# Patient Record
Sex: FEMALE | Race: WHITE | NOT HISPANIC OR LATINO | URBAN - METROPOLITAN AREA
[De-identification: names, ages, dates, MRNs, and addresses within clinical notes are randomized per-mention and may not be internally consistent; named-entity substitution may affect disease eponyms.]

---

## 2017-06-09 ENCOUNTER — OUTPATIENT (OUTPATIENT)
Dept: OUTPATIENT SERVICES | Facility: HOSPITAL | Age: 77
LOS: 1 days | End: 2017-06-09

## 2018-01-10 PROBLEM — Z00.00 ENCOUNTER FOR PREVENTIVE HEALTH EXAMINATION: Status: ACTIVE | Noted: 2018-01-10

## 2018-01-24 ENCOUNTER — APPOINTMENT (OUTPATIENT)
Dept: CARDIOLOGY | Facility: CLINIC | Age: 78
End: 2018-01-24
Payer: MEDICARE

## 2018-01-24 VITALS
DIASTOLIC BLOOD PRESSURE: 74 MMHG | HEART RATE: 60 BPM | HEIGHT: 61 IN | BODY MASS INDEX: 33.23 KG/M2 | WEIGHT: 176 LBS | SYSTOLIC BLOOD PRESSURE: 130 MMHG

## 2018-01-24 DIAGNOSIS — Z82.49 FAMILY HISTORY OF ISCHEMIC HEART DISEASE AND OTHER DISEASES OF THE CIRCULATORY SYSTEM: ICD-10-CM

## 2018-01-24 PROCEDURE — 99214 OFFICE O/P EST MOD 30 MIN: CPT

## 2018-01-24 RX ORDER — ROSUVASTATIN CALCIUM 20 MG/1
20 TABLET, FILM COATED ORAL
Refills: 0 | Status: ACTIVE | COMMUNITY

## 2018-01-24 RX ORDER — OMEGA-3/DHA/EPA/FISH OIL 35-113.5MG
1000 TABLET,CHEWABLE ORAL
Refills: 0 | Status: ACTIVE | COMMUNITY

## 2018-01-24 RX ORDER — LEVOTHYROXINE SODIUM 0.07 MG/1
75 TABLET ORAL DAILY
Refills: 0 | Status: ACTIVE | COMMUNITY

## 2018-01-24 RX ORDER — UBIDECARENONE/VIT E ACET 100MG-5
100 CAPSULE ORAL
Refills: 0 | Status: ACTIVE | COMMUNITY

## 2018-01-24 RX ORDER — CALCIUM CITRATE/VITAMIN D3 315MG-6.25
TABLET ORAL
Refills: 0 | Status: ACTIVE | COMMUNITY

## 2018-01-24 RX ORDER — CHROMIUM 200 MCG
400 TABLET ORAL
Refills: 0 | Status: ACTIVE | COMMUNITY

## 2018-01-24 RX ORDER — MULTIVIT-MIN/FOLIC/VIT K/LYCOP 400-300MCG
1000 TABLET ORAL
Refills: 0 | Status: ACTIVE | COMMUNITY

## 2018-01-24 RX ORDER — BIOTIN 5 MG
5 CAPSULE ORAL
Refills: 0 | Status: ACTIVE | COMMUNITY

## 2018-04-19 ENCOUNTER — APPOINTMENT (OUTPATIENT)
Dept: CARDIOLOGY | Facility: CLINIC | Age: 78
End: 2018-04-19
Payer: MEDICARE

## 2018-04-19 VITALS
HEIGHT: 61 IN | DIASTOLIC BLOOD PRESSURE: 84 MMHG | HEART RATE: 78 BPM | SYSTOLIC BLOOD PRESSURE: 132 MMHG | WEIGHT: 78 LBS | BODY MASS INDEX: 14.73 KG/M2

## 2018-04-19 PROCEDURE — 99214 OFFICE O/P EST MOD 30 MIN: CPT

## 2018-05-24 ENCOUNTER — APPOINTMENT (OUTPATIENT)
Dept: CARDIOLOGY | Facility: CLINIC | Age: 78
End: 2018-05-24
Payer: MEDICARE

## 2018-05-24 PROCEDURE — 93015 CV STRESS TEST SUPVJ I&R: CPT

## 2018-05-24 PROCEDURE — 78452 HT MUSCLE IMAGE SPECT MULT: CPT

## 2018-05-24 PROCEDURE — A9502: CPT

## 2018-05-24 PROCEDURE — 93306 TTE W/DOPPLER COMPLETE: CPT

## 2018-05-29 ENCOUNTER — APPOINTMENT (OUTPATIENT)
Dept: CARDIOLOGY | Facility: CLINIC | Age: 78
End: 2018-05-29
Payer: MEDICARE

## 2018-05-29 VITALS
WEIGHT: 183 LBS | HEIGHT: 61 IN | SYSTOLIC BLOOD PRESSURE: 124 MMHG | BODY MASS INDEX: 34.55 KG/M2 | HEART RATE: 64 BPM | DIASTOLIC BLOOD PRESSURE: 78 MMHG

## 2018-05-29 DIAGNOSIS — Z86.39 PERSONAL HISTORY OF OTHER ENDOCRINE, NUTRITIONAL AND METABOLIC DISEASE: ICD-10-CM

## 2018-05-29 DIAGNOSIS — Z86.79 PERSONAL HISTORY OF OTHER DISEASES OF THE CIRCULATORY SYSTEM: ICD-10-CM

## 2018-05-29 DIAGNOSIS — Z87.19 PERSONAL HISTORY OF OTHER DISEASES OF THE DIGESTIVE SYSTEM: ICD-10-CM

## 2018-05-29 DIAGNOSIS — Z87.898 PERSONAL HISTORY OF OTHER SPECIFIED CONDITIONS: ICD-10-CM

## 2018-05-29 DIAGNOSIS — E78.00 PURE HYPERCHOLESTEROLEMIA, UNSPECIFIED: ICD-10-CM

## 2018-05-29 PROCEDURE — 99214 OFFICE O/P EST MOD 30 MIN: CPT

## 2018-05-29 RX ORDER — NAPROXEN 500 MG/1
500 TABLET ORAL
Qty: 14 | Refills: 0 | Status: DISCONTINUED | COMMUNITY
Start: 2017-11-01 | End: 2018-05-29

## 2018-07-03 ENCOUNTER — OUTPATIENT (OUTPATIENT)
Dept: OUTPATIENT SERVICES | Facility: HOSPITAL | Age: 78
LOS: 1 days | End: 2018-07-03

## 2018-08-08 ENCOUNTER — APPOINTMENT (OUTPATIENT)
Dept: CARDIOLOGY | Facility: CLINIC | Age: 78
End: 2018-08-08
Payer: MEDICARE

## 2018-08-08 VITALS
HEIGHT: 61 IN | BODY MASS INDEX: 34.55 KG/M2 | SYSTOLIC BLOOD PRESSURE: 120 MMHG | DIASTOLIC BLOOD PRESSURE: 62 MMHG | HEART RATE: 62 BPM | WEIGHT: 183 LBS

## 2018-08-08 DIAGNOSIS — Z86.79 PERSONAL HISTORY OF OTHER DISEASES OF THE CIRCULATORY SYSTEM: ICD-10-CM

## 2018-08-08 PROCEDURE — 99214 OFFICE O/P EST MOD 30 MIN: CPT

## 2018-11-29 ENCOUNTER — APPOINTMENT (OUTPATIENT)
Dept: CARDIOLOGY | Facility: CLINIC | Age: 78
End: 2018-11-29
Payer: MEDICARE

## 2018-11-29 VITALS
BODY MASS INDEX: 34.36 KG/M2 | DIASTOLIC BLOOD PRESSURE: 72 MMHG | RESPIRATION RATE: 14 BRPM | SYSTOLIC BLOOD PRESSURE: 122 MMHG | HEIGHT: 61 IN | OXYGEN SATURATION: 96 % | WEIGHT: 182 LBS | HEART RATE: 72 BPM

## 2018-11-29 DIAGNOSIS — I36.9 NONRHEUMATIC TRICUSPID VALVE DISORDER, UNSPECIFIED: ICD-10-CM

## 2018-11-29 PROCEDURE — 99214 OFFICE O/P EST MOD 30 MIN: CPT

## 2018-11-29 RX ORDER — VIT C/E/ZN/COPPR/LUTEIN/ZEAXAN 250MG-90MG
CAPSULE ORAL
Refills: 0 | Status: ACTIVE | COMMUNITY

## 2018-11-29 NOTE — PHYSICAL EXAM
[General Appearance - Well Developed] : well developed [Normal Appearance] : normal appearance [Well Groomed] : well groomed [General Appearance - Well Nourished] : well nourished [No Deformities] : no deformities [General Appearance - In No Acute Distress] : no acute distress [Normal Conjunctiva] : the conjunctiva exhibited no abnormalities [Eyelids - No Xanthelasma] : the eyelids demonstrated no xanthelasmas [Normal Jugular Venous A Waves Present] : normal jugular venous A waves present [Normal Jugular Venous V Waves Present] : normal jugular venous V waves present [No Jugular Venous Cruz A Waves] : no jugular venous cruz A waves [Respiration, Rhythm And Depth] : normal respiratory rhythm and effort [Exaggerated Use Of Accessory Muscles For Inspiration] : no accessory muscle use [Auscultation Breath Sounds / Voice Sounds] : lungs were clear to auscultation bilaterally [Heart Rate And Rhythm] : heart rate and rhythm were normal [Heart Sounds] : normal S1 and S2 [Abdomen Soft] : soft [Abdomen Tenderness] : non-tender [Abdomen Mass (___ Cm)] : no abdominal mass palpated [Abnormal Walk] : normal gait [Gait - Sufficient For Exercise Testing] : the gait was sufficient for exercise testing [Nail Clubbing] : no clubbing of the fingernails [Cyanosis, Localized] : no localized cyanosis [Petechial Hemorrhages (___cm)] : no petechial hemorrhages [Skin Color & Pigmentation] : normal skin color and pigmentation [] : no rash [No Venous Stasis] : no venous stasis [Skin Lesions] : no skin lesions [No Skin Ulcers] : no skin ulcer [No Xanthoma] : no  xanthoma was observed [Oriented To Time, Place, And Person] : oriented to person, place, and time [Affect] : the affect was normal [Mood] : the mood was normal [No Anxiety] : not feeling anxious [FreeTextEntry1] : MARU

## 2018-11-29 NOTE — ASSESSMENT
[FreeTextEntry1] : Hypertension, well controlled. Low-salt diet has been discussed with her.\par \par  Dyslipidemia, meeting NCEP goal. She knows what food should be avoided. \par \par Overweight status. Weight reduction by diet and exercise. \par \par Shortness of breath. Normal LV function on Echo and normal myocardial perfusion scan. Overall cardiac wise, the patient is stable. \par \par Aggressive risk factor modification has been discussed with her. She will be reevaluated by me in about six months.

## 2018-11-29 NOTE — HISTORY OF PRESENT ILLNESS
[FreeTextEntry1] : Ms. Connelly is a very pleasant 78 -year-old female patient, came for cardiac  follow up  .   \par \par Since I saw her last, the patient has done very, very well.  She does get short of breath easily but does not have any exertional chest pain. Denies PND, orthopnea, diaphoresis, dizziness, palpitations, or pedal edema. \par \par She is very compliant to medication and low-cholesterol diet. \par \par She does get carotid Doppler  and told that they appeared to be normal, although I did not have any report. \par \par She also had a lipid profile done and meeting NCEP goal.     \par \par No weight loss.

## 2019-02-14 ENCOUNTER — APPOINTMENT (OUTPATIENT)
Dept: CARDIOLOGY | Facility: CLINIC | Age: 79
End: 2019-02-14

## 2019-05-29 ENCOUNTER — APPOINTMENT (OUTPATIENT)
Dept: CARDIOLOGY | Facility: CLINIC | Age: 79
End: 2019-05-29
Payer: MEDICARE

## 2019-05-29 VITALS
HEIGHT: 61 IN | HEART RATE: 69 BPM | DIASTOLIC BLOOD PRESSURE: 72 MMHG | BODY MASS INDEX: 35.12 KG/M2 | SYSTOLIC BLOOD PRESSURE: 130 MMHG | OXYGEN SATURATION: 98 % | WEIGHT: 186 LBS

## 2019-05-29 PROCEDURE — 99214 OFFICE O/P EST MOD 30 MIN: CPT

## 2019-05-29 PROCEDURE — 93000 ELECTROCARDIOGRAM COMPLETE: CPT

## 2019-07-09 ENCOUNTER — OUTPATIENT (OUTPATIENT)
Dept: OUTPATIENT SERVICES | Facility: HOSPITAL | Age: 79
LOS: 1 days | End: 2019-07-09

## 2019-08-27 ENCOUNTER — NON-APPOINTMENT (OUTPATIENT)
Age: 79
End: 2019-08-27

## 2019-08-27 ENCOUNTER — APPOINTMENT (OUTPATIENT)
Dept: CARDIOLOGY | Facility: CLINIC | Age: 79
End: 2019-08-27
Payer: MEDICARE

## 2019-08-27 VITALS
DIASTOLIC BLOOD PRESSURE: 76 MMHG | HEART RATE: 67 BPM | BODY MASS INDEX: 34.23 KG/M2 | HEIGHT: 62 IN | SYSTOLIC BLOOD PRESSURE: 122 MMHG | WEIGHT: 186 LBS

## 2019-08-27 PROCEDURE — 99214 OFFICE O/P EST MOD 30 MIN: CPT

## 2019-08-27 PROCEDURE — 93000 ELECTROCARDIOGRAM COMPLETE: CPT

## 2019-08-27 NOTE — REASON FOR VISIT
[Follow-Up - Clinic] : a clinic follow-up of [FreeTextEntry2] : Preop evaluation for corrective toe surgery with Dr. Lewis at [facility] on 9/5/19

## 2019-08-27 NOTE — HISTORY OF PRESENT ILLNESS
[FreeTextEntry1] : Ms. Connelly is a very pleasant 78 -year-old female patient, came for cardiac  follow up.\par \par Since I saw her last, the patient has done very, very well.  She does get short of breath easily but does not have any exertional chest pain. Denies PND, orthopnea, diaphoresis, dizziness, palpitations, or pedal edema. \par \par Hx of iron deficiency anemia.  No recent labs are available to review.  These will be requested from PMD. \par \par She does get carotid Doppler  and told that they appeared to be normal, although I did not have any report. \par \par She also had a lipid profile done and meeting NCEP goal.     \par \par No weight loss.\par \par EKG done for preoperative evaluation was ordered and reviewed by me. Normal sinus rhythm with left bundle branch block.

## 2019-08-27 NOTE — ASSESSMENT
[FreeTextEntry1] : Hypertension, well controlled. Low-salt diet has been discussed with her.\par \par  Dyslipidemia, meeting NCEP goal. She knows what food should be avoided. \par \par Iron Deficiency:  Labs requested from PMD.  \par \par Overweight status. Weight reduction by diet and exercise. \par \par Shortness of breath. Normal LV function on Echo and normal myocardial perfusion scan. Overall cardiac wise, the patient is stable. \par \par At present, there are no active cardiac conditions. \par No recent unstable coronary syndromes, decompensated heart failure, severe valvular heart disease or significant dysrhythmias. \par Baseline functional status is acceptable. \par The clinical benefit of the proposed procedure outweighs the associated cardiovascular risk. \par Risk not attenuated with further CV testing. \par Prior testing as outlined above.\par Optimized from a cardiovascular perspective. \par \par Aggressive risk factor modification has been discussed with her. She will be reevaluated by Dr. Reyna in about six months.

## 2019-08-27 NOTE — PHYSICAL EXAM
[General Appearance - Well Developed] : well developed [Normal Appearance] : normal appearance [Well Groomed] : well groomed [General Appearance - Well Nourished] : well nourished [No Deformities] : no deformities [General Appearance - In No Acute Distress] : no acute distress [Normal Conjunctiva] : the conjunctiva exhibited no abnormalities [Eyelids - No Xanthelasma] : the eyelids demonstrated no xanthelasmas [Normal Jugular Venous A Waves Present] : normal jugular venous A waves present [Normal Jugular Venous V Waves Present] : normal jugular venous V waves present [No Jugular Venous Cruz A Waves] : no jugular venous cruz A waves [Respiration, Rhythm And Depth] : normal respiratory rhythm and effort [Auscultation Breath Sounds / Voice Sounds] : lungs were clear to auscultation bilaterally [Exaggerated Use Of Accessory Muscles For Inspiration] : no accessory muscle use [Heart Sounds] : normal S1 and S2 [Heart Rate And Rhythm] : heart rate and rhythm were normal [Abdomen Soft] : soft [Abdomen Tenderness] : non-tender [Abdomen Mass (___ Cm)] : no abdominal mass palpated [Abnormal Walk] : normal gait [Nail Clubbing] : no clubbing of the fingernails [Gait - Sufficient For Exercise Testing] : the gait was sufficient for exercise testing [Cyanosis, Localized] : no localized cyanosis [Petechial Hemorrhages (___cm)] : no petechial hemorrhages [Skin Color & Pigmentation] : normal skin color and pigmentation [Skin Lesions] : no skin lesions [] : no rash [No Venous Stasis] : no venous stasis [No Skin Ulcers] : no skin ulcer [No Xanthoma] : no  xanthoma was observed [Oriented To Time, Place, And Person] : oriented to person, place, and time [Affect] : the affect was normal [No Anxiety] : not feeling anxious [Mood] : the mood was normal [FreeTextEntry1] : MARU

## 2019-09-05 ENCOUNTER — RESULT REVIEW (OUTPATIENT)
Age: 79
End: 2019-09-05

## 2019-10-02 PROBLEM — I36.9 NONRHEUMATIC TRICUSPID VALVE DISORDER: Status: RESOLVED | Noted: 2018-01-24 | Resolved: 2019-10-02

## 2020-03-18 ENCOUNTER — APPOINTMENT (OUTPATIENT)
Dept: CARDIOLOGY | Facility: CLINIC | Age: 80
End: 2020-03-18

## 2020-06-03 ENCOUNTER — APPOINTMENT (OUTPATIENT)
Dept: CARDIOLOGY | Facility: CLINIC | Age: 80
End: 2020-06-03
Payer: MEDICARE

## 2020-06-03 VITALS
DIASTOLIC BLOOD PRESSURE: 70 MMHG | WEIGHT: 179 LBS | HEART RATE: 62 BPM | SYSTOLIC BLOOD PRESSURE: 134 MMHG | TEMPERATURE: 98.4 F | BODY MASS INDEX: 32.74 KG/M2 | OXYGEN SATURATION: 95 %

## 2020-06-03 PROCEDURE — 99214 OFFICE O/P EST MOD 30 MIN: CPT

## 2021-06-01 ENCOUNTER — OUTPATIENT (OUTPATIENT)
Dept: OUTPATIENT SERVICES | Facility: HOSPITAL | Age: 81
LOS: 1 days | End: 2021-06-01

## 2021-06-03 ENCOUNTER — APPOINTMENT (OUTPATIENT)
Dept: CARDIOLOGY | Facility: CLINIC | Age: 81
End: 2021-06-03
Payer: MEDICARE

## 2021-06-03 VITALS
HEART RATE: 53 BPM | WEIGHT: 174 LBS | TEMPERATURE: 98.1 F | DIASTOLIC BLOOD PRESSURE: 74 MMHG | BODY MASS INDEX: 31.83 KG/M2 | SYSTOLIC BLOOD PRESSURE: 110 MMHG | OXYGEN SATURATION: 97 %

## 2021-06-03 DIAGNOSIS — Z01.810 ENCOUNTER FOR PREPROCEDURAL CARDIOVASCULAR EXAMINATION: ICD-10-CM

## 2021-06-03 PROCEDURE — 99214 OFFICE O/P EST MOD 30 MIN: CPT

## 2022-04-06 ENCOUNTER — APPOINTMENT (OUTPATIENT)
Dept: ULTRASOUND IMAGING | Facility: CLINIC | Age: 82
End: 2022-04-06
Payer: MEDICARE

## 2022-04-06 PROCEDURE — 93971 EXTREMITY STUDY: CPT | Mod: LT

## 2022-06-22 ENCOUNTER — APPOINTMENT (OUTPATIENT)
Dept: CARDIOLOGY | Facility: CLINIC | Age: 82
End: 2022-06-22

## 2022-06-30 ENCOUNTER — APPOINTMENT (OUTPATIENT)
Dept: CARDIOLOGY | Facility: CLINIC | Age: 82
End: 2022-06-30

## 2022-06-30 VITALS
OXYGEN SATURATION: 95 % | BODY MASS INDEX: 32.56 KG/M2 | WEIGHT: 178 LBS | HEART RATE: 57 BPM | TEMPERATURE: 97.8 F | SYSTOLIC BLOOD PRESSURE: 110 MMHG | DIASTOLIC BLOOD PRESSURE: 80 MMHG

## 2022-06-30 PROCEDURE — 99214 OFFICE O/P EST MOD 30 MIN: CPT

## 2022-06-30 RX ORDER — CYCLOSPORINE 0.5 MG/ML
0.05 EMULSION OPHTHALMIC
Qty: 60 | Refills: 0 | Status: ACTIVE | COMMUNITY
Start: 2022-01-13

## 2022-06-30 RX ORDER — FENOFIBRATE 48 MG/1
48 TABLET ORAL
Qty: 90 | Refills: 0 | Status: ACTIVE | COMMUNITY
Start: 2022-04-17

## 2022-06-30 RX ORDER — LOSARTAN POTASSIUM 100 MG/1
100 TABLET, FILM COATED ORAL
Qty: 90 | Refills: 0 | Status: ACTIVE | COMMUNITY
Start: 2021-12-29

## 2022-12-15 ENCOUNTER — APPOINTMENT (OUTPATIENT)
Dept: CARDIOLOGY | Facility: CLINIC | Age: 82
End: 2022-12-15

## 2022-12-15 VITALS
SYSTOLIC BLOOD PRESSURE: 120 MMHG | TEMPERATURE: 97.8 F | DIASTOLIC BLOOD PRESSURE: 70 MMHG | WEIGHT: 181 LBS | HEART RATE: 65 BPM | OXYGEN SATURATION: 96 % | BODY MASS INDEX: 33.11 KG/M2

## 2022-12-15 PROCEDURE — 93306 TTE W/DOPPLER COMPLETE: CPT

## 2022-12-15 PROCEDURE — 99214 OFFICE O/P EST MOD 30 MIN: CPT

## 2023-06-15 ENCOUNTER — NON-APPOINTMENT (OUTPATIENT)
Age: 83
End: 2023-06-15

## 2023-06-15 ENCOUNTER — APPOINTMENT (OUTPATIENT)
Dept: CARDIOLOGY | Facility: CLINIC | Age: 83
End: 2023-06-15
Payer: MEDICARE

## 2023-06-15 VITALS
DIASTOLIC BLOOD PRESSURE: 60 MMHG | SYSTOLIC BLOOD PRESSURE: 116 MMHG | WEIGHT: 185 LBS | OXYGEN SATURATION: 95 % | HEART RATE: 64 BPM | BODY MASS INDEX: 33.84 KG/M2

## 2023-06-15 PROCEDURE — 93000 ELECTROCARDIOGRAM COMPLETE: CPT

## 2023-06-15 PROCEDURE — 99214 OFFICE O/P EST MOD 30 MIN: CPT

## 2023-12-09 LAB — HBA1C MFR BLD HPLC: 5.6

## 2023-12-16 LAB — HBA1C MFR BLD HPLC: 5.6

## 2023-12-18 ENCOUNTER — APPOINTMENT (OUTPATIENT)
Dept: CARDIOLOGY | Facility: CLINIC | Age: 83
End: 2023-12-18
Payer: MEDICARE

## 2023-12-18 VITALS
BODY MASS INDEX: 33.29 KG/M2 | DIASTOLIC BLOOD PRESSURE: 62 MMHG | HEART RATE: 64 BPM | WEIGHT: 182 LBS | OXYGEN SATURATION: 95 % | SYSTOLIC BLOOD PRESSURE: 116 MMHG

## 2023-12-18 PROCEDURE — 99214 OFFICE O/P EST MOD 30 MIN: CPT

## 2023-12-18 RX ORDER — OMEPRAZOLE 20 MG/1
20 CAPSULE, DELAYED RELEASE ORAL
Qty: 90 | Refills: 2 | Status: ACTIVE | COMMUNITY

## 2023-12-18 NOTE — DISCUSSION/SUMMARY
[FreeTextEntry1] : Hypertension, well controlled. Low-salt diet has been discussed with her.  Dyslipidemia, meeting NCEP goal. She knows what food should be avoided.  Overweight status. Weight reduction by diet and exercise.  Shortness of breath. Echo confirmed normal LV size, LVEF and moderate mitral regurgitation.. I recommended Lexiscan marker perfusion scan for any evaluate symptoms as well as CAD evaluation as patient has a left bundle branch block.  Anemia -he has been advised to follow with hematologist.  Bronchitis -Medrol pack   Aggressive risk factor modification has been discussed with her. She will be reevaluated by me in about 6 to 12 months.

## 2023-12-18 NOTE — HISTORY OF PRESENT ILLNESS
[FreeTextEntry1] : Ms. Connelly is a very pleasant 81 -year-old female patient, came for cardiac  follow up  .  She was admitted to [facility] on November 18, 2023 for address shortness of breath, EKG showed left bundle branch block, MI was ruled out.  She was told her symptoms are due to anemia.  She was transfused and she feels better  Still she feels some wheezing and coughing.  Since I saw her last, the patient has done very, very well.  She does get short of breath easily but does not have any exertional chest pain. Denies PND, orthopnea, diaphoresis, dizziness, palpitations, or pedal edema.   December 15, 2022   showed normal LV size, LV thickness, LVEF 60 to 65% , moderate mitral regurgitation with a normal pulm artery pressure.  She does get carotid Doppler  and told that they appeared to be normal, although I did not have any report.   She also had a lipid profile done and meeting NCEP goal.

## 2024-04-03 ENCOUNTER — APPOINTMENT (OUTPATIENT)
Dept: CARDIOLOGY | Facility: CLINIC | Age: 84
End: 2024-04-03
Payer: MEDICARE

## 2024-04-03 VITALS
HEIGHT: 62 IN | DIASTOLIC BLOOD PRESSURE: 72 MMHG | SYSTOLIC BLOOD PRESSURE: 114 MMHG | WEIGHT: 176 LBS | HEART RATE: 77 BPM | BODY MASS INDEX: 32.39 KG/M2 | OXYGEN SATURATION: 97 %

## 2024-04-03 DIAGNOSIS — E61.1 IRON DEFICIENCY: ICD-10-CM

## 2024-04-03 LAB — HBA1C MFR BLD HPLC: 5.4

## 2024-04-03 PROCEDURE — 99215 OFFICE O/P EST HI 40 MIN: CPT

## 2024-04-03 PROCEDURE — G2211 COMPLEX E/M VISIT ADD ON: CPT

## 2024-04-03 RX ORDER — ASPIRIN 81 MG
81 TABLET, DELAYED RELEASE (ENTERIC COATED) ORAL DAILY
Refills: 0 | Status: DISCONTINUED | COMMUNITY
End: 2024-04-03

## 2024-04-03 RX ORDER — METOPROLOL SUCCINATE 25 MG/1
25 TABLET, EXTENDED RELEASE ORAL DAILY
Refills: 0 | Status: ACTIVE | COMMUNITY

## 2024-04-03 RX ORDER — APIXABAN 5 MG/1
5 TABLET, FILM COATED ORAL
Qty: 180 | Refills: 3 | Status: ACTIVE | COMMUNITY
Start: 1900-01-01 | End: 1900-01-01

## 2024-04-03 RX ORDER — METHYLPREDNISOLONE 4 MG/1
4 TABLET ORAL DAILY
Qty: 21 | Refills: 0 | Status: DISCONTINUED | COMMUNITY
Start: 2023-12-18 | End: 2024-04-03

## 2024-04-03 RX ORDER — METOPROLOL SUCCINATE 50 MG/1
50 TABLET, EXTENDED RELEASE ORAL DAILY
Refills: 0 | Status: DISCONTINUED | COMMUNITY
End: 2024-04-03

## 2024-04-03 RX ORDER — DILTIAZEM HYDROCHLORIDE 30 MG/1
30 TABLET ORAL TWICE DAILY
Refills: 0 | Status: ACTIVE | COMMUNITY

## 2024-04-03 NOTE — DISCUSSION/SUMMARY
[FreeTextEntry1] : FLORENCIO ARANDA is a 83 year old F who presents today Apr 03, 2024 with the above history and the following active issues:   SOB/lightheadedness in the setting of new onset AF Still awaiting ER records from [facility] to review clinical course.  Pt is good historian and reports sx resolved after medicine that put her back in normal rhythm RRR on exam today and BP/HR stable and asx today in office.  She is travelling to [place] where her daughter is a nurse and will check BP/HR daily and call us if any significant abnormalities.  Upon her return, echo to eval degree of VHD and LVEF. Monitor to eval burden of PAF and rate control.  Obtain a nuclear stress test to evaluate for evidence of myocardial ischemia.  Ideally wean off dilt and maintain toprol upon her return/after review of testing.  She is off aspirin and tolerating eliquis 5mg BID. will check CBC for anemia and TSH/T4, lytes, etc.  Reviewed bleeding precautions  Hypertension, well controlled. Low-salt diet. Cont losartan + rate control meds as above.   Dyslipidemia, meeting NCEP goal. Cont crestor.   Overweight status. Weight reduction by diet and exercise.  Known LBBB, planned CV testing as above.  Anemia - follow with hematologist.  F/U after testing to review results. Discussed red flag symptoms, which would warrant sooner or emergent medical evaluation. Any questions and concerns were addressed and resolved.   Sincerely,  LINDSEY Gonzales Patients history, testing, and plan reviewed with supervising MD: Dr. Jerry Deleon

## 2024-04-03 NOTE — HISTORY OF PRESENT ILLNESS
[FreeTextEntry1] : Ms. Connelly is a very pleasant 83 -year-old female patient  PMH HTN, HLD, anemia, hypothyroidism.   Detailed hx: She was admitted to [facility] on November 18, 2023 for address shortness of breath, EKG showed left bundle branch block, MI was ruled out.  She was told her symptoms are due to anemia.  She was transfused and she feels better   She now presents today 4/3/24 after a hospitalization for new onset AF. She has been preparing for a trip and was trying on clothes at [employer] suddenly felt lightheaded and SOB. She went to [facility] ER and was found to be in AF. I currently do not have the records available but pt is good historian and reports after being given medicine she went back to a normal rhythm and felt much better. She was discharged on toprol, dilt, and eliquis 5mg BID. She is now off of aspirin.  Today, she denies chest pain, pressure, palpitations, unusual shortness of breath, orthopnea, LE edema, lightheadedness, dizziness, near syncope or syncope.  She is not aware of any inciting events for her ER visit aside from increased stress r/t travel.   Her last echo 2022 showed EF 60-65% and moderate MR No ischemic eval since 2018.

## 2024-04-22 ENCOUNTER — APPOINTMENT (OUTPATIENT)
Dept: CARDIOLOGY | Facility: CLINIC | Age: 84
End: 2024-04-22
Payer: MEDICARE

## 2024-04-22 PROCEDURE — 93242 EXT ECG>48HR<7D RECORDING: CPT

## 2024-04-22 PROCEDURE — 93306 TTE W/DOPPLER COMPLETE: CPT

## 2024-04-22 PROCEDURE — A9502: CPT

## 2024-04-22 PROCEDURE — 78452 HT MUSCLE IMAGE SPECT MULT: CPT

## 2024-04-22 PROCEDURE — 93015 CV STRESS TEST SUPVJ I&R: CPT

## 2024-05-10 ENCOUNTER — APPOINTMENT (OUTPATIENT)
Dept: CARDIOLOGY | Facility: CLINIC | Age: 84
End: 2024-05-10
Payer: MEDICARE

## 2024-05-10 VITALS
HEART RATE: 65 BPM | SYSTOLIC BLOOD PRESSURE: 140 MMHG | WEIGHT: 180 LBS | HEIGHT: 61 IN | BODY MASS INDEX: 33.99 KG/M2 | DIASTOLIC BLOOD PRESSURE: 72 MMHG | OXYGEN SATURATION: 97 %

## 2024-05-10 DIAGNOSIS — I48.91 UNSPECIFIED ATRIAL FIBRILLATION: ICD-10-CM

## 2024-05-10 DIAGNOSIS — I44.7 LEFT BUNDLE-BRANCH BLOCK, UNSPECIFIED: ICD-10-CM

## 2024-05-10 DIAGNOSIS — I10 ESSENTIAL (PRIMARY) HYPERTENSION: ICD-10-CM

## 2024-05-10 DIAGNOSIS — E78.5 HYPERLIPIDEMIA, UNSPECIFIED: ICD-10-CM

## 2024-05-10 DIAGNOSIS — E03.9 HYPOTHYROIDISM, UNSPECIFIED: ICD-10-CM

## 2024-05-10 DIAGNOSIS — E66.9 OBESITY, UNSPECIFIED: ICD-10-CM

## 2024-05-10 DIAGNOSIS — R94.31 ABNORMAL ELECTROCARDIOGRAM [ECG] [EKG]: ICD-10-CM

## 2024-05-10 DIAGNOSIS — R06.02 SHORTNESS OF BREATH: ICD-10-CM

## 2024-05-10 DIAGNOSIS — K21.9 GASTRO-ESOPHAGEAL REFLUX DISEASE W/OUT ESOPHAGITIS: ICD-10-CM

## 2024-05-10 DIAGNOSIS — R06.09 OTHER FORMS OF DYSPNEA: ICD-10-CM

## 2024-05-10 PROCEDURE — G2211 COMPLEX E/M VISIT ADD ON: CPT

## 2024-05-10 PROCEDURE — 99214 OFFICE O/P EST MOD 30 MIN: CPT

## 2024-05-10 NOTE — HISTORY OF PRESENT ILLNESS
[FreeTextEntry1] : Ms. Connelly is a very pleasant 83 -year-old female patient with history of hypertension, dyslipidemia, hypothyroidism, obesity, paroxysmal atrial fibrillation, GERD, chronic low bundle-branch block came for cardiac testing follow-up  April 22, 2024    Lexiscan myocardial perfusion scan showed attenuation artifact due to left bundle branch block without inducible ischemia April 22, 2024 echocardiogram confirmed abnormal septal motion due to left bundle branch block, moderate LVH, moderate mitral regurgitation, LVEF 65%  April 22, 2024   Zio patch done for 7 days showed heart rate ranged from 45 to 156 bpm average 65 bpm.  Underlying rhythm.  Normal sinus rhythm, PAC and PVC burden less than 1% without recurrent atrial fibrillation  She now presents today 4/3/24 after a hospitalization for new onset AF. She has been preparing for a trip and was trying on clothes at [employer] suddenly felt lightheaded and SOB. She went to [facility] ER and was found to be in AF. I currently do not have the records available but pt is good historian and reports after being given medicine she went back to a normal rhythm and felt much better. She was discharged on toprol, dilt, and eliquis 5mg BID. She is now off of aspirin.  Today, she denies chest pain, pressure, palpitations, unusual shortness of breath, orthopnea, LE edema, lightheadedness, dizziness, near syncope or syncope.

## 2024-05-10 NOTE — DISCUSSION/SUMMARY
[FreeTextEntry1] : Paroxysmal atrial fibrillation -she understood importance of anticoagulation to prevent thromboembolic events, continue Eliquis 5 mg twice daily.  Echo confirmed normal size, LV thickness, wall motion, LVEF.  Lexiscan myocardial perfusion scan did not show inducible ischemia.  Zio patch did not confirm recurrent atrial fibrillation.  Moderate mitral regurgitation with LVH on echocardiogram -intermittent monitoring with echocardiogram, patient asymptomatic  Hypertension, well controlled. Low-salt diet. Cont losartan + rate control meds as above.   Dyslipidemia, meeting NCEP goal. Cont crestor.   Overweight status. Weight reduction by diet and exercise.  Known LBBB, planned CV testing as above.  Anemia - follow with hematologist.  Aggressive risk factor modification has been discussed with the patient at great length in regular walking, compliance to medication, low-cholesterol diet.  She will be reeval by me in 4 months  
No Vaccines Administered.

## 2024-05-23 PROCEDURE — 93244 EXT ECG>48HR<7D REV&INTERPJ: CPT

## 2024-06-24 ENCOUNTER — APPOINTMENT (OUTPATIENT)
Dept: CARDIOLOGY | Facility: CLINIC | Age: 84
End: 2024-06-24

## 2024-07-09 ENCOUNTER — APPOINTMENT (OUTPATIENT)
Dept: CARDIOLOGY | Facility: CLINIC | Age: 84
End: 2024-07-09
Payer: MEDICARE

## 2024-07-09 ENCOUNTER — NON-APPOINTMENT (OUTPATIENT)
Age: 84
End: 2024-07-09

## 2024-07-09 VITALS
SYSTOLIC BLOOD PRESSURE: 136 MMHG | HEIGHT: 61 IN | BODY MASS INDEX: 33.99 KG/M2 | OXYGEN SATURATION: 98 % | DIASTOLIC BLOOD PRESSURE: 76 MMHG | WEIGHT: 180 LBS | HEART RATE: 70 BPM

## 2024-07-09 DIAGNOSIS — E78.5 HYPERLIPIDEMIA, UNSPECIFIED: ICD-10-CM

## 2024-07-09 DIAGNOSIS — I44.7 LEFT BUNDLE-BRANCH BLOCK, UNSPECIFIED: ICD-10-CM

## 2024-07-09 DIAGNOSIS — I48.0 PAROXYSMAL ATRIAL FIBRILLATION: ICD-10-CM

## 2024-07-09 PROCEDURE — 93000 ELECTROCARDIOGRAM COMPLETE: CPT

## 2024-07-09 PROCEDURE — 99214 OFFICE O/P EST MOD 30 MIN: CPT

## 2024-09-12 ENCOUNTER — APPOINTMENT (OUTPATIENT)
Dept: CARDIOLOGY | Facility: CLINIC | Age: 84
End: 2024-09-12
Payer: MEDICARE

## 2024-09-12 VITALS
DIASTOLIC BLOOD PRESSURE: 64 MMHG | WEIGHT: 176 LBS | SYSTOLIC BLOOD PRESSURE: 122 MMHG | OXYGEN SATURATION: 99 % | BODY MASS INDEX: 33.26 KG/M2 | HEART RATE: 60 BPM

## 2024-09-12 DIAGNOSIS — E78.5 HYPERLIPIDEMIA, UNSPECIFIED: ICD-10-CM

## 2024-09-12 DIAGNOSIS — E66.9 OBESITY, UNSPECIFIED: ICD-10-CM

## 2024-09-12 DIAGNOSIS — K21.9 GASTRO-ESOPHAGEAL REFLUX DISEASE W/OUT ESOPHAGITIS: ICD-10-CM

## 2024-09-12 DIAGNOSIS — I44.7 LEFT BUNDLE-BRANCH BLOCK, UNSPECIFIED: ICD-10-CM

## 2024-09-12 DIAGNOSIS — R06.09 OTHER FORMS OF DYSPNEA: ICD-10-CM

## 2024-09-12 DIAGNOSIS — R94.31 ABNORMAL ELECTROCARDIOGRAM [ECG] [EKG]: ICD-10-CM

## 2024-09-12 DIAGNOSIS — I10 ESSENTIAL (PRIMARY) HYPERTENSION: ICD-10-CM

## 2024-09-12 DIAGNOSIS — I48.0 PAROXYSMAL ATRIAL FIBRILLATION: ICD-10-CM

## 2024-09-12 DIAGNOSIS — E03.9 HYPOTHYROIDISM, UNSPECIFIED: ICD-10-CM

## 2024-09-12 DIAGNOSIS — R06.02 SHORTNESS OF BREATH: ICD-10-CM

## 2024-09-12 DIAGNOSIS — I48.91 UNSPECIFIED ATRIAL FIBRILLATION: ICD-10-CM

## 2024-09-12 PROCEDURE — G2211 COMPLEX E/M VISIT ADD ON: CPT

## 2024-09-12 PROCEDURE — 99214 OFFICE O/P EST MOD 30 MIN: CPT

## 2024-09-12 RX ORDER — ROSUVASTATIN CALCIUM 20 MG/1
20 TABLET, FILM COATED ORAL
Refills: 0 | Status: ACTIVE | COMMUNITY

## 2024-09-12 NOTE — DISCUSSION/SUMMARY
[FreeTextEntry1] : Paroxysmal atrial fibrillation -she understood importance of anticoagulation to prevent thromboembolic events, continue Eliquis 5 mg twice daily.  Echo confirmed normal size, LV thickness, wall motion, LVEF.  Lexiscan myocardial perfusion scan did not show inducible ischemia.  Zio patch did not confirm recurrent atrial fibrillation.  Moderate mitral regurgitation with LVH on echocardiogram -intermittent monitoring with echocardiogram, patient asymptomatic  Hypertension, well controlled. Low-salt diet. Cont losartan + rate control meds as above.   Dyslipidemia, meeting NCEP goal. Cont crestor.   Overweight status. Weight reduction by diet and exercise.  Known LBBB, planned CV testing as above.  Anemia - follow with hematologist.  Aggressive risk factor modification has been discussed with the patient at great length in regular walking, compliance to medication, low-cholesterol diet.  She will be reeval by me in 6 months after lipid panel

## 2024-09-12 NOTE — HISTORY OF PRESENT ILLNESS
[FreeTextEntry1] : Ms. Connelly is a very pleasant 83 -year-old female patient with history of hypertension, dyslipidemia, hypothyroidism, obesity, paroxysmal atrial fibrillation, GERD, chronic low bundle-branch block came for cardiac testing follow-up  Today, she denies chest pain, pressure, palpitations, unusual shortness of breath, orthopnea, LE edema, lightheadedness, dizziness, near syncope or syncope.  Now she is status post cataract surgery    April 22, 2024    Lexiscan myocardial perfusion scan showed attenuation artifact due to left bundle branch block without inducible ischemia April 22, 2024 echocardiogram confirmed abnormal septal motion due to left bundle branch block, moderate LVH, moderate mitral regurgitation, LVEF 65%  April 22, 2024   Zio patch done for 7 days showed heart rate ranged from 45 to 156 bpm average 65 bpm.  Underlying rhythm.  Normal sinus rhythm, PAC and PVC burden less than 1% without recurrent atrial fibrillation  She now presents today 4/3/24 after a hospitalization for new onset AF. She has been preparing for a trip and was trying on clothes at [employer] suddenly felt lightheaded and SOB. She went to [facility] ER and was found to be in AF. I currently do not have the records available but pt is good historian and reports after being given medicine she went back to a normal rhythm and felt much better. She was discharged on toprol, dilt, and eliquis 5mg BID. She is now off of aspirin.

## 2025-03-20 ENCOUNTER — APPOINTMENT (OUTPATIENT)
Dept: CARDIOLOGY | Facility: CLINIC | Age: 85
End: 2025-03-20
Payer: MEDICARE

## 2025-03-20 VITALS
HEART RATE: 57 BPM | BODY MASS INDEX: 32.69 KG/M2 | SYSTOLIC BLOOD PRESSURE: 150 MMHG | DIASTOLIC BLOOD PRESSURE: 90 MMHG | WEIGHT: 173 LBS | OXYGEN SATURATION: 95 %

## 2025-03-20 DIAGNOSIS — E03.9 HYPOTHYROIDISM, UNSPECIFIED: ICD-10-CM

## 2025-03-20 DIAGNOSIS — I48.91 UNSPECIFIED ATRIAL FIBRILLATION: ICD-10-CM

## 2025-03-20 DIAGNOSIS — Z86.79 PERSONAL HISTORY OF OTHER DISEASES OF THE CIRCULATORY SYSTEM: ICD-10-CM

## 2025-03-20 DIAGNOSIS — K21.9 GASTRO-ESOPHAGEAL REFLUX DISEASE W/OUT ESOPHAGITIS: ICD-10-CM

## 2025-03-20 DIAGNOSIS — I10 ESSENTIAL (PRIMARY) HYPERTENSION: ICD-10-CM

## 2025-03-20 DIAGNOSIS — I48.0 PAROXYSMAL ATRIAL FIBRILLATION: ICD-10-CM

## 2025-03-20 DIAGNOSIS — R06.09 OTHER FORMS OF DYSPNEA: ICD-10-CM

## 2025-03-20 DIAGNOSIS — I44.7 LEFT BUNDLE-BRANCH BLOCK, UNSPECIFIED: ICD-10-CM

## 2025-03-20 DIAGNOSIS — E66.9 OBESITY, UNSPECIFIED: ICD-10-CM

## 2025-03-20 DIAGNOSIS — E78.5 HYPERLIPIDEMIA, UNSPECIFIED: ICD-10-CM

## 2025-03-20 DIAGNOSIS — R06.02 SHORTNESS OF BREATH: ICD-10-CM

## 2025-03-20 PROCEDURE — G2211 COMPLEX E/M VISIT ADD ON: CPT

## 2025-03-20 PROCEDURE — 99214 OFFICE O/P EST MOD 30 MIN: CPT

## 2025-03-20 RX ORDER — SPIRONOLACTONE 25 MG/1
25 TABLET ORAL DAILY
Qty: 90 | Refills: 2 | Status: ACTIVE | COMMUNITY
Start: 2025-03-20 | End: 1900-01-01

## 2025-04-28 LAB — HBA1C MFR BLD HPLC: 5.7

## 2025-05-01 ENCOUNTER — NON-APPOINTMENT (OUTPATIENT)
Age: 85
End: 2025-05-01

## 2025-05-01 ENCOUNTER — APPOINTMENT (OUTPATIENT)
Dept: CARDIOLOGY | Facility: CLINIC | Age: 85
End: 2025-05-01
Payer: MEDICARE

## 2025-05-01 VITALS
WEIGHT: 178 LBS | OXYGEN SATURATION: 96 % | HEIGHT: 61 IN | SYSTOLIC BLOOD PRESSURE: 120 MMHG | HEART RATE: 60 BPM | DIASTOLIC BLOOD PRESSURE: 72 MMHG | BODY MASS INDEX: 33.61 KG/M2

## 2025-05-01 DIAGNOSIS — E78.5 HYPERLIPIDEMIA, UNSPECIFIED: ICD-10-CM

## 2025-05-01 DIAGNOSIS — R06.02 SHORTNESS OF BREATH: ICD-10-CM

## 2025-05-01 DIAGNOSIS — K21.9 GASTRO-ESOPHAGEAL REFLUX DISEASE W/OUT ESOPHAGITIS: ICD-10-CM

## 2025-05-01 DIAGNOSIS — I48.0 PAROXYSMAL ATRIAL FIBRILLATION: ICD-10-CM

## 2025-05-01 DIAGNOSIS — I10 ESSENTIAL (PRIMARY) HYPERTENSION: ICD-10-CM

## 2025-05-01 DIAGNOSIS — E03.9 HYPOTHYROIDISM, UNSPECIFIED: ICD-10-CM

## 2025-05-01 DIAGNOSIS — I44.7 LEFT BUNDLE-BRANCH BLOCK, UNSPECIFIED: ICD-10-CM

## 2025-05-01 DIAGNOSIS — E66.9 OBESITY, UNSPECIFIED: ICD-10-CM

## 2025-05-01 PROCEDURE — 99214 OFFICE O/P EST MOD 30 MIN: CPT

## 2025-05-01 PROCEDURE — G2211 COMPLEX E/M VISIT ADD ON: CPT

## 2025-06-16 ENCOUNTER — APPOINTMENT (OUTPATIENT)
Dept: VASCULAR SURGERY | Facility: CLINIC | Age: 85
End: 2025-06-16
Payer: MEDICARE

## 2025-06-16 VITALS
HEIGHT: 60 IN | OXYGEN SATURATION: 99 % | WEIGHT: 178 LBS | BODY MASS INDEX: 34.95 KG/M2 | SYSTOLIC BLOOD PRESSURE: 152 MMHG | DIASTOLIC BLOOD PRESSURE: 83 MMHG | HEART RATE: 44 BPM

## 2025-06-16 PROCEDURE — 93970 EXTREMITY STUDY: CPT

## 2025-06-16 PROCEDURE — 99204 OFFICE O/P NEW MOD 45 MIN: CPT

## 2025-07-07 ENCOUNTER — APPOINTMENT (OUTPATIENT)
Dept: VASCULAR SURGERY | Facility: CLINIC | Age: 85
End: 2025-07-07

## 2025-07-07 PROCEDURE — 36475 ENDOVENOUS RF 1ST VEIN: CPT | Mod: RT

## 2025-07-07 PROCEDURE — 37766 PHLEB VEINS - EXTREM 20+: CPT | Mod: RT

## 2025-07-07 PROCEDURE — 36471 NJX SCLRSNT MLT INCMPTNT VN: CPT | Mod: RT

## 2025-07-14 ENCOUNTER — APPOINTMENT (OUTPATIENT)
Dept: VASCULAR SURGERY | Facility: CLINIC | Age: 85
End: 2025-07-14
Payer: MEDICARE

## 2025-07-14 PROCEDURE — 76942 ECHO GUIDE FOR BIOPSY: CPT

## 2025-07-14 PROCEDURE — 36471 NJX SCLRSNT MLT INCMPTNT VN: CPT | Mod: RT

## 2025-07-14 PROCEDURE — 93971 EXTREMITY STUDY: CPT | Mod: RT

## 2025-07-14 PROCEDURE — 37765 STAB PHLEB VEINS XTR 10-20: CPT | Mod: RT

## 2025-07-18 ENCOUNTER — APPOINTMENT (OUTPATIENT)
Dept: CARDIOLOGY | Facility: CLINIC | Age: 85
End: 2025-07-18
Payer: MEDICARE

## 2025-07-18 VITALS
BODY MASS INDEX: 32.81 KG/M2 | DIASTOLIC BLOOD PRESSURE: 56 MMHG | OXYGEN SATURATION: 97 % | SYSTOLIC BLOOD PRESSURE: 104 MMHG | WEIGHT: 168 LBS | HEART RATE: 76 BPM

## 2025-07-18 PROBLEM — R55 SYNCOPE, UNSPECIFIED SYNCOPE TYPE: Status: ACTIVE | Noted: 2025-07-18

## 2025-07-18 PROBLEM — Z86.79 HISTORY OF ESSENTIAL HYPERTENSION: Status: RESOLVED | Noted: 2018-01-24 | Resolved: 2025-07-18

## 2025-07-18 PROBLEM — I83.819 VARICOSE VEINS WITH PAIN: Status: ACTIVE | Noted: 2025-06-16

## 2025-07-18 PROCEDURE — 99215 OFFICE O/P EST HI 40 MIN: CPT

## 2025-07-18 PROCEDURE — 93000 ELECTROCARDIOGRAM COMPLETE: CPT

## 2025-07-21 ENCOUNTER — APPOINTMENT (OUTPATIENT)
Dept: VASCULAR SURGERY | Facility: CLINIC | Age: 85
End: 2025-07-21
Payer: MEDICARE

## 2025-07-21 PROCEDURE — 36471 NJX SCLRSNT MLT INCMPTNT VN: CPT | Mod: LT,79

## 2025-07-21 PROCEDURE — 37765 STAB PHLEB VEINS XTR 10-20: CPT | Mod: LT,79

## 2025-07-21 PROCEDURE — 36475 ENDOVENOUS RF 1ST VEIN: CPT | Mod: LT,79

## 2025-07-21 PROCEDURE — 93971 EXTREMITY STUDY: CPT | Mod: RT,59

## 2025-07-24 ENCOUNTER — APPOINTMENT (OUTPATIENT)
Dept: CARDIOLOGY | Facility: CLINIC | Age: 85
End: 2025-07-24

## 2025-07-28 ENCOUNTER — APPOINTMENT (OUTPATIENT)
Dept: VASCULAR SURGERY | Facility: CLINIC | Age: 85
End: 2025-07-28
Payer: MEDICARE

## 2025-07-28 PROCEDURE — 36471 NJX SCLRSNT MLT INCMPTNT VN: CPT | Mod: LT,58

## 2025-07-28 PROCEDURE — 76942 ECHO GUIDE FOR BIOPSY: CPT

## 2025-07-28 PROCEDURE — 93971 EXTREMITY STUDY: CPT | Mod: LT

## 2025-09-03 ENCOUNTER — NON-APPOINTMENT (OUTPATIENT)
Age: 85
End: 2025-09-03